# Patient Record
(demographics unavailable — no encounter records)

---

## 2024-10-18 NOTE — ASSESSMENT
[FreeTextEntry1] : 77 year old man with past gross hematuria. Negative cysto and CTU 7/2022. UA last visit was neg for RBCs. repeat urine studies last visit were negative. Will observe.   For BPH with urgency, continue finasteride 5 mg daily. Discussed OAB medications and STUART reducing procedures but pt chooses to wait for now. RTO in 6-12 months.

## 2024-10-18 NOTE — HISTORY OF PRESENT ILLNESS
[FreeTextEntry1] : 75 year old man seen 07/13/2022 with complaint of blood in urine that he saw. No dysuria, no hesitancy, no straining. He does report RIGHT hip pain, but does not radiate from flank or to groin.  It is moderate in severity. Nothing makes the symptoms better, nothing makes sx worse.  It is associated with nothing. He also report frequency and urgency, though this is his baseline. No new acute LUTS since hematuria began. He was seen at urgent care and given levoquin.  Per pt, the urgent care portal does not have urinalysis or UCx results. Of note, pt is former smoker.   04/21/2023: Patient presents for follow up. He reports improvement of LUTS with finasteride. No gross hematuria. Stream much improved. urgency improved but persists and still some bother.  10/23/2023: Patient presents for follow up. He reports  symptoms have improved. Some urgency, but no UUI. He reports nocturia 2-3x/night but feels this is better than prevoius. Overall, more comfortable. No gross hematuria or other acute complaints.  04/19/2024: Patient presents for follow up. He reports nocturia 2x/night, not bothersome. Some urgency, no UUI. overall comfortable. No hematuria, no dysuria. .   10/18/2024: Patient presents for follow up. He reports LUTS remain well contorlled. Nocturia 3x/night, not bothersome. no frequency, urgency, or hematuria. PVR 2 mL.

## 2025-05-28 NOTE — REVIEW OF SYSTEMS
[Nasal Discharge] : nasal discharge [Sore Throat] : sore throat [Cough] : cough [Nocturia] : nocturia [Fever] : no fever [Chills] : no chills [Discharge] : discharge [Vision Problems] : no vision problems [Earache] : no earache [Chest Pain] : no chest pain [Palpitations] : no palpitations [Shortness Of Breath] : no shortness of breath [Abdominal Pain] : no abdominal pain [Nausea] : no nausea [Constipation] : no constipation [Diarrhea] : no diarrhea [Vomiting] : no vomiting [Dysuria] : no dysuria [Frequency] : no frequency [Itching] : no itching [Mole Changes] : no mole changes [Skin Rash] : no skin rash [Headache] : no headache [Dizziness] : no dizziness

## 2025-05-28 NOTE — PHYSICAL EXAM
[No Acute Distress] : no acute distress [Well-Appearing] : well-appearing [EOMI] : extraocular movements intact [Normal Outer Ear/Nose] : the outer ears and nose were normal in appearance [Normal Oropharynx] : the oropharynx was normal [No Lymphadenopathy] : no lymphadenopathy [Supple] : supple [Thyroid Normal, No Nodules] : the thyroid was normal and there were no nodules present [No Respiratory Distress] : no respiratory distress  [Normal Rate] : normal rate  [Regular Rhythm] : with a regular rhythm [Normal S1, S2] : normal S1 and S2 [Soft] : abdomen soft [Non Tender] : non-tender [Non-distended] : non-distended [No Joint Swelling] : no joint swelling [Grossly Normal Strength/Tone] : grossly normal strength/tone [No Rash] : no rash [Coordination Grossly Intact] : coordination grossly intact [No Focal Deficits] : no focal deficits [Normal Gait] : normal gait [Normal Affect] : the affect was normal [Normal Insight/Judgement] : insight and judgment were intact [de-identified] : injected sclera with mucus  [de-identified] : wheezing in RLL

## 2025-05-28 NOTE — HISTORY OF PRESENT ILLNESS
[FreeTextEntry1] : Establish care only  [de-identified] : 79yo male with PMH of HTN, HLD, T2DM, BPH, seasonal allergies who presents to the office to establish care only.  Previously followed with PMD Dr Milton Moreira, last seen for CPE 9/2024.   He reports recent sinus issues. Admits to rhinorrhea with a large mucus drainage. Reports each morning he gargles with Listerine which helps loosen the mucus. Reports a cough. Reports that his daughter and three grandkids tested positive for strep throat 1-2 weeks ago.  Admits to some gooey discharge from his left eye. He reports his grandkids are constantly sick.  Denies fever or chills.

## 2025-05-28 NOTE — ASSESSMENT
[Vaccines Reviewed] : Immunizations reviewed today. Please see immunization details in the vaccine log within the immunization flowsheet.  [FreeTextEntry1] : #HCM - Patient presenting to [facility] care only, had CPE in 9/2024 - Received flu vaccine this season - Based on USPSTF screening guidelines and shared decision-making with patient, age out of colon cancer screening - Will check routine blood work today and discuss results with patient   #HTN - Chronic - Known history of white coat HTN - Daughter checks regularly at home with readings in 130s-140s - Continue amlodipine 5mg, Toprol 100mg - Has history of angioedema to Lisinopril, eye swelling with Losartan   #HLD - Chronic - On Lipitor 80mg - Repeat lipid profile (patient not fasting today)  #T2DM - Chronic - Unknown last A1c - On Farxiga 10mg, Tradjenta 5mg - Check A1c   #BPH  - Chronic - On Doxazosin, Finasteride 5mg - Check PSA  #Sinusitis - Reports several weeks of worsening sinusitis - Wheezing on examination - Sent Azithromycin  #Conjunctivities - Injected sclera with discharge - Start ofloxacin

## 2025-05-28 NOTE — HEALTH RISK ASSESSMENT
[Very Good] : ~his/her~  mood as very good [No] : In the past 12 months have you used drugs other than those required for medical reasons? No [0] : 2) Feeling down, depressed, or hopeless: Not at all (0) [PHQ-2 Negative - No further assessment needed] : PHQ-2 Negative - No further assessment needed [Patient reported colonoscopy was normal] : Patient reported colonoscopy was normal [With Family] : lives with family [Retired] : retired [] :  [# Of Children ___] : has [unfilled] children [Former] : Former [20 or more] : 20 or more [> 15 Years] : > 15 Years [de-identified] : active at home, PT exercises  [de-identified] : like vegetables  [[id number]] : 0 [Change in mental status noted] : No change in mental status noted [Reports changes in hearing] : Reports no changes in hearing [Reports changes in vision] : Reports no changes in vision [Reports changes in dental health] : Reports no changes in dental health [Smoke Detector] : smoke detector [Carbon Monoxide Detector] : carbon monoxide detector [ColonoscopyDate] : 10/15 [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